# Patient Record
Sex: MALE | Race: WHITE | URBAN - METROPOLITAN AREA
[De-identification: names, ages, dates, MRNs, and addresses within clinical notes are randomized per-mention and may not be internally consistent; named-entity substitution may affect disease eponyms.]

---

## 2018-01-25 ENCOUNTER — TELEPHONE (OUTPATIENT)
Dept: FAMILY MEDICINE | Facility: OTHER | Age: 53
End: 2018-01-25

## 2018-01-25 NOTE — TELEPHONE ENCOUNTER
Influenza-Like Illness ([facility]) Protocol    Lopez D Day      Age: 52 year old     YOB: 1965    Are you currently sick or have you had close contact with someone who is currently sick?   Yes, this patient is currently sick.     Adult Clinical Evaluation    Is this patient experiencing ANY of the following?  Unconsciousness or unresponsiveness No   Difficulty breathing or swallowing No   Blue or dusky lips, skin, or nail beds No   Chest pain No, discomfort from coughing   Severe confusion or delirium No   Seizure activity: ongoing or stopped No   Severe dehydration or signs of shock No   Patient sounds very sick on the phone No     Is this patient experiencing ANY of the following?  Fever > 104 or shaking chills No   Wheezing with minimal response to usual wheezing medications or new wheezing No   Repeated vomiting or diarrhea with signs of dehydration (no urination within last 12 hours) No   Flu-like symptoms that initially improved but returned with fever and a worse cough No   Stiff or painful neck No   Severe headache No     Does the patient have any of the following?  Measured fever > 100 degrees Yes   Chills or feels very warm to the touch Yes   Cough Yes   Sore throat Yes   Muscle/ body aches Yes   Headaches Yes   Fatigue (tiredness) Yes     Nursing Plan      Below are conditions which place adults at increased risk for the more severe complications of influenza.    Does this patient have ANY of the following conditions?  Chronic pulmonary disease such as asthma or COPD No   Heart disease (CHF, CAD, anticoag due to arrhythmia) No   Liver disease (hepatitis, liver failure, cirrhosis) No   Kidney disease (renal failure, insufficiency or dialysis) No   Metabolic disorder (e.g. diabetes) No   Neuromuscular disorder (ANSELMO ALVARADO MD, myasthenia gravis) No   Compromised ability to handle respiratory secretions No   Hematologic disorder (e.g. sickle cell disease) No   HIV / AIDS No   Chemotherapy or radiation  within the last 3 months No   Received an organ or a bone marrow transplant No   Taking Prednisone in excess of 20mg daily No   Is age 65 years or older No   Is pregnant, thinks she may be pregnant or is within two weeks after delivery No   Is a resident of a chronic care facility No   Is patient  or [place] native  No     Patient does not fall into high risk category.  Offered Home Care Education.  If no improvement in 3-5 days, patient should be seen by a provider.    If further questions/concerns or if new symptoms develop, call your PCP or [facility] as soon as possible.      Provided home care instructions    General home care instruction:      Avoid contact with people in your household who are at increased risk for more severe complications of influenza (such as pregnant women or people who have a chronic health condition, for example diabetes, heart disease, asthma, or emphysema).    Stay home from work, school, childcare or other public places until your fever (37.8 degrees Celsius [100 degrees Fahrenheit]) has been gone for at least 24 hours, except to seek medical care. (Fever should be gone without the use of fever-reducing medications.) Use a surgical mask if available, or cover your mouth and nose with a tissue if possible if you need to seek medical care. Contact your work place, school, or  as they may have longer exclusion times.    You may continue to shed virus after your fever is gone. Limit your contact with high-risk individuals for 10 days after your symptoms started and be especially careful to cover your coughs/sneezes and wash your hands.    Cover your cough and wash your hands often, and especially after coughing, sneezing, blowing your nose.    Drink plenty of fluids (such as water, broth, sports drinks, electrolyte beverages for children) to prevent dehydration.    Avoid tobacco and second hand smoke.    Get plenty of rest.    Use over-the-counter pain  relievers as needed per  instructions.    Do not give aspirin (acetylsalicylic acid) or products that contain aspirin (e.g. bismuth subsalicylate - Pepto Bismol) to children or teenagers 18 years or younger.    A small number of people with influenza do not have fever. If you have respiratory symptoms and are at increased risk for complications of influenza, contact your health care provider to discuss these symptoms.    For parents of infants:    If possible, only family members who are not sick should care for infants.    Wash your hands with soap and water, or an alcohol-based hand rub (if your hands are not visibly soiled) before caring for your infant.    Cover your mouth and nose with a tissue when coughing or sneezing, and clean your hands.    Contact a health care provider to discuss your illness within 1-2 days if you are    Pregnant    Immunocompromised    Call 911 if you experience:    Difficulty breathing or shortness of breath    Pain or pressure in the chest    Confusion or less responsive than normal    Seizure activity: ongoing or stopped    Severe dehydration or signs of shock     Blue or dusky lips, skin, or nail beds    If further questions/concerns or if new symptoms develop, call your PCP or [facility] as soon as possible.    When to seek medical attention    Contact your health care provider right away if you experience:    A painful sore throat accompanied by fever persists for more than 48 hours    Ear pain, sinus pain, persistent vomiting and/or diarrhea    Oral temperature greater than 104  Fahrenheit (40  Celsius)    Dehydration (e.g., mouth feeling dry, dizzy when sitting/standing, decreased urine output)    Severe or persistent vomiting; unable to keep fluids down    Improvement in flu-like symptoms (fever and cough or sore throat) but then return of fever and worse cough or sore throat    Not drinking enough fluid    Any other concerns not stated  above      Additional educational resources include:    http://www.[employer].com    http://www.cdc.gov/flu/  Yoly Castro

## 2020-02-28 ENCOUNTER — TRANSCRIBE ORDERS (OUTPATIENT)
Dept: OTHER | Age: 55
End: 2020-02-28

## 2020-02-28 DIAGNOSIS — R51.9 HEADACHE: Primary | ICD-10-CM

## 2020-03-02 ENCOUNTER — PRE VISIT (OUTPATIENT)
Dept: NEUROLOGY | Facility: CLINIC | Age: 55
End: 2020-03-02

## 2020-03-02 ENCOUNTER — DOCUMENTATION ONLY (OUTPATIENT)
Dept: CARE COORDINATION | Facility: CLINIC | Age: 55
End: 2020-03-02

## 2020-03-02 NOTE — TELEPHONE ENCOUNTER
FUTURE VISIT INFORMATION      FUTURE VISIT INFORMATION:    Date: 3/13/2020    Time: 3pm    Location: [facility]  REFERRAL INFORMATION:    Referring provider:      Referring providers clinic:  [facility]     Reason for visit/diagnosis  Headaches     RECORDS REQUESTED FROM:       Clinic name Comments Records Status Imaging Status   [facility]   Scanned to media tab  N/A    [facility] MR Brain 10/24/2018  7/23/2016-ED Visit  Care Everywhere Requested to PACS

## 2020-03-13 ENCOUNTER — OFFICE VISIT (OUTPATIENT)
Dept: NEUROLOGY | Facility: CLINIC | Age: 55
End: 2020-03-13
Payer: COMMERCIAL

## 2020-03-13 VITALS
HEART RATE: 56 BPM | OXYGEN SATURATION: 96 % | SYSTOLIC BLOOD PRESSURE: 155 MMHG | WEIGHT: 184.6 LBS | DIASTOLIC BLOOD PRESSURE: 94 MMHG

## 2020-03-13 DIAGNOSIS — R51.9 CHRONIC DAILY HEADACHE: Primary | ICD-10-CM

## 2020-03-13 RX ORDER — ERENUMAB-AOOE 140 MG/ML
140 INJECTION, SOLUTION SUBCUTANEOUS
COMMUNITY
End: 2020-04-27

## 2020-03-13 RX ORDER — ASPIRIN 81 MG/1
81 TABLET ORAL DAILY
COMMUNITY

## 2020-03-13 RX ORDER — GINSENG 100 MG
1 CAPSULE ORAL DAILY
COMMUNITY

## 2020-03-13 RX ORDER — CANDESARTAN 4 MG/1
TABLET ORAL
Qty: 60 TABLET | Refills: 6 | Status: SHIPPED | OUTPATIENT
Start: 2020-03-13 | End: 2020-04-27

## 2020-03-13 RX ORDER — CEFUROXIME AXETIL 250 MG/1
6 TABLET ORAL 2 TIMES DAILY PRN
COMMUNITY
End: 2020-07-27

## 2020-03-13 RX ORDER — UBROGEPANT 50 MG/1
50 TABLET ORAL
Qty: 12 TABLET | Refills: 3 | Status: SHIPPED | OUTPATIENT
Start: 2020-03-13 | End: 2020-04-07 | Stop reason: DRUGHIGH

## 2020-03-13 SDOH — HEALTH STABILITY: MENTAL HEALTH: HOW MANY STANDARD DRINKS CONTAINING ALCOHOL DO YOU HAVE ON A TYPICAL DAY?: 1 OR 2

## 2020-03-13 SDOH — HEALTH STABILITY: MENTAL HEALTH: HOW OFTEN DO YOU HAVE A DRINK CONTAINING ALCOHOL?: 4 OR MORE TIMES A WEEK

## 2020-03-13 ASSESSMENT — ENCOUNTER SYMPTOMS
ARTHRALGIAS: 1
MUSCLE WEAKNESS: 0
SPEECH CHANGE: 1
CONSTIPATION: 0
MUSCLE CRAMPS: 1
SORE THROAT: 0
NECK MASS: 0
TREMORS: 0
NUMBNESS: 0
DOUBLE VISION: 0
TASTE DISTURBANCE: 0
NAUSEA: 1
BLOATING: 0
LOSS OF CONSCIOUSNESS: 0
DIZZINESS: 0
PARALYSIS: 0
BACK PAIN: 0
EYE REDNESS: 0
STIFFNESS: 0
VOMITING: 0
TINGLING: 0
HOARSE VOICE: 0
SINUS CONGESTION: 0
MEMORY LOSS: 1
JOINT SWELLING: 1
DISTURBANCES IN COORDINATION: 1
WEAKNESS: 0
MYALGIAS: 0
TROUBLE SWALLOWING: 0
SEIZURES: 0
EYE WATERING: 0
ABDOMINAL PAIN: 0
NECK PAIN: 0
SINUS PAIN: 0
EYE PAIN: 1
SMELL DISTURBANCE: 0
HEADACHES: 1
HEARTBURN: 1
EYE IRRITATION: 0

## 2020-03-13 ASSESSMENT — HEADACHE IMPACT TEST (HIT 6)
HOW OFTEN HAVE YOU FELT TOO TIRED TO WORK BECAUSE OF YOUR HEADACHES: VERY OFTEN
HOW OFTEN DO HEADACHES LIMIT YOUR DAILY ACTIVITIES: VERY OFTEN
WHEN YOU HAVE A HEADACHE HOW OFTEN DO YOU WISH YOU COULD LIE DOWN: ALWAYS
HIT6 TOTAL SCORE: 70
HOW OFTEN HAVE YOU FELT FED UP OR IRRITATED BECAUSE OF YOUR HEADACHES: ALWAYS
HOW OFTEN DID HEADACHS LIMIT CONCENTRATION ON WORK OR DAILY ACTIVITY: VERY OFTEN
WHEN YOU HAVE HEADACHES HOW OFTEN IS THE PAIN SEVERE: VERY OFTEN

## 2020-03-13 ASSESSMENT — PAIN SCALES - GENERAL: PAINLEVEL: MODERATE PAIN (4)

## 2020-03-13 NOTE — PATIENT INSTRUCTIONS
Plan:  [facility] [place] [place]   Preventive migraine treatment -a trial of candesartan as instructed. Monitor your blood pressure daily at initiation of therapy and follow up with your PCP for blood pressure management   Acute migraine treatment -a trial of Ubrevly Common side effects : Nausea , Somnolence. Unknown side effects with a long term use.     PMR -trigger points and possible Botox injections or to be seen at our [facility] with unknown date at this time  Follow up in 6-8 weeks or sooner if needed         Patient Education     Candesartan tablets  Brand Name: Atacand  What is this medicine?  CANDESARTAN (solitario eb AR tan) is used to treat high blood pressure in children and adults. This drug is also used to treat adults with heart failure.  How should I use this medicine?  Take this medicine by mouth with a glass of water. Follow the directions on the prescription label. This medicine can be taken with or without food. Take your doses at regular intervals. Do not take your medicine more often than directed.  Talk to your pediatrician regarding the use of this medicine in children. While this drug may be prescribed for children as young as 1 year for selected conditions, precautions do apply. For children who are unable to swallow tablets, the pharmacist can prepare the medicine in an oral suspension.  What side effects may I notice from receiving this medicine?  Side effects that you should report to your doctor or health care professional as soon as possible:    allergic reactions like skin rash, itching or hives, swelling of the face, lips, or tongue    breathing problems    chest pain    decreased amount of urine passed    fast or irregular heart beat    feeling faint or lightheaded, falls    swelling of your hands or feet    Side effects that usually do not require medical attention (report to your doctor or health care professional if they continue or are  bothersome):    change in sex drive or performance    cough    headache    nausea or stomach pain  What may interact with this medicine?    blood pressure medicines    diuretics, especially triamterene, spironolactone, or amiloride    lithium    NSAIDs, medicines for pain and inflammation, like ibuprofen or naproxen    potassium salts or potassium supplements    What if I miss a dose?  If you miss a dose, take it as soon as you can. If it is almost time for your next dose, take only that dose. Do not take double or extra doses.  Where should I keep my medicine?  Keep out of the reach of children.  Store at room temperature between 15 and 30 degrees C (59 and 86 degrees F). Protect from light. Keep container tightly closed. Throw away any unused medicine after the expiration date.  What should I tell my health care provider before I take this medicine?  They need to know if you have any of these conditions:    heart failure    kidney or liver disease    if you are on a special diet, such as a low salt diet    an unusual or allergic reaction to candesartan, other medicines, foods, dyes, or preservatives    pregnant or trying to get pregnant    breast-feeding  What should I watch for while using this medicine?  Visit your doctor or health care professional for regular checks on your progress. Check your blood pressure as directed. Ask your doctor or health care professional what your blood pressure should be and when you should contact him or her. Call your doctor or health care professional if you notice an irregular or fast heart beat.  Women should inform their doctor if they wish to become pregnant or think they might be pregnant. There is a potential for serious side effects to an unborn child, particularly in the second or third trimester. Talk to your health care professional or pharmacist for more information.  You may get drowsy or dizzy. Do not drive, use machinery, or do anything that needs mental alertness  until you know how this drug affects you. Do not stand or sit up quickly, especially if you are an older patient. This reduces the risk of dizzy or fainting spells.  Avoid salt substitutes unless you are told otherwise by your doctor or health care professional.  Do not treat yourself for coughs, colds, or pain while you are taking this medicine without asking your doctor or health care professional for advice. Some ingredients may increase your blood pressure.  NOTE:This sheet is a summary. It may not cover all possible information. If you have questions about this medicine, talk to your doctor, pharmacist, or health care provider. Copyright  2019 [employer]

## 2020-03-13 NOTE — PROGRESS NOTES
"Re: Lopez Cancino      MRN# [id number]  YOB: 1965  Date of Visit:3/13/2020    OUTPATIENT NEUROLOGY VISIT NOTE    Chief Complaint:  Headache evaluation    History of Present Illness  Lopez Cancino is a 54-year-old male presents to the clinic today for headache evaluation.  Accompanied to today's appointment by his wife.  Headache History:    Onset History:  For 14 years ago and seen 7 neurologists for headaches in [place] and [facility] in [place], [place] and [place]. Unfortunately no outside records available  Wife and patient think that headaches are related to \"exposure in the \"     Current Headache Pattern:      Frequency (How many headache days per month?): 24/7 and varies in the intensity and wakes up with headaches      Duration of Headache: severe headache longest 2 days      Aura: none      Associated Symptoms:  nausea, light sensitivity, sound sensitivity and hard to think, focus or articulate, \"spotty vision\"        Description of Headache Pain & Location: bitemporal across his forehead and top of the head and \"top behind\" and feels like a sharp pain and once it gets more intense it \"grows sharper\" and feels like an \"ice picks\" and sometimes left >right \"ice pick thru\" and       Level of Pain during usual headache:  4/10    Level of Pain during the worst headache:  Up to 9/10 rarely and 8/10 at least couple times per week       Treatments Tried:    Started Aimovig 2 years ago and \"levels\" his headache up and satisfied about 25%   Tylenol extra strength for a few month  [facility]ac [facility]  Sumatriptan injectable did not work  Oxycodone -in the past and \"hate to take it\"  Tizanidine -did not help  miloxicam -did not help  Rizatriptan did not help  Botox -had only once   Ambien -did not help  Amitriptyline -caused \"hearing voices\"  depakote -did not do anything  Dilantin -did not help  topiramate -history of renal stones  Verapamil " "-it did not do anything  Propranolol did not touch it  [facility] -\"[facility] course [facility]\" did not help  3 sessions of acupuncture did not help  Cefaly -did not help  DHE infusion   Demerol did not help  Uses CPAP for 14 years   Stopped ibuprofen about 2 months ago and no difference  BBB-4-5 years ago      Have you needed to utilize the Emergency Room to treat your headache symptoms?  No    What makes your headaches better?  Sometimes sleeping but \"not neccessary\"     What makes your headaches worse or triggers your headaches? All random      No head injuries   Was in the  for 21 years -[employer] - and  in the [employer] and retired in 2008. Currently works as a laser service engeneer  Sleeping -toss and turn and does not take anything just a Tylenol PM  Caffeine -1-2 cups per day =64oz per day +cutting back on soda and may be 4 cans per week   Quit smoking 5-6 years ago   Alcohol -1-2 glasses per day -burgundy 1-2 glasses or beer 16 oz daily   Hydration-about 3-6 bottles per day   Exercise-bike and weight +job        Neurodiagnostic Testing not available but presumed normal  [facility] MRIs, MRAs, [facility]  [facility]   Past Medical History reviewed and verified with the patient  Principal Problem:  Renal colic  Active Problems:  Uric acid nephrolithiasis  Sleep apnea and uses CPAP  Nicotine addiction quit 5-6 years   Lactic acidosis/sepsis in 2018  ARF (acute renal failure) recovered  Gastroesophageal reflux disease without esophagitis  HTN (hypertension) and not on medications   history of fever and a blood culture positive for Staphylococcus, coagulase negative (in 1 out of 2 bottles from 06/09/2018).     Past Surgical History reviewed and verified with the patient  Right shoulder/clavicle surgery -  Hernia surgery   Vasectomy  Knee surgery   Family History reviewed and verified with the patient  No headaches   Social History:  Wife of 27 years and has 4 kids-3 " daughters and son, and has 6 grandkids  Social History     Tobacco Use     Smoking status: Former Smoker     Smokeless tobacco: Former User   Substance Use Topics     Alcohol use: Yes     Frequency: 4 or more times a week     Drinks per session: 1 or 2    reviewed and verified with the patient     Allergies   Allergen Reactions     Nuts Anaphylaxis     Can't have anything that's been in contact with nuts.     Amitriptyline      Other reaction(s): Confusion     Hmg-Coa-R Inhibitors Other (See Comments)     Cramping     Penicillins      Other reaction(s): Fever       Current Outpatient Medications   Medication Sig Dispense Refill     aspirin 81 MG EC tablet Take 81 mg by mouth daily       Cholecalciferol (VITAMIN D3) 50 MCG (2000 UT) CHEW Take 4,000 Units by mouth daily       erenumab-aooe (AIMOVIG) 140 MG/ML injection Inject 140 mg Subcutaneous every 28 days       magnesium 500 MG TABS Take 1 tablet by mouth daily       ranitidine (ZANTAC) 150 MG tablet Take 150 mg by mouth 2 times daily       SUMAtriptan (IMITREX STATDOSE) 6 MG/0.5ML pen injector kit Inject 6 mg Subcutaneous 2 times daily as needed       zinc 50 MG TABS Take 1 tablet by mouth daily     reviewed and verified with the patient    Review of Systems:   A 12-point ROS including constitutional, eyes, ENT, respiratory, cardiovascular, gastroenterology, genitourinary, integumentary, musculoskeletal, neurology, hematology and psychiatric were all reviewed with the patient and completed at the [facility] Question coy and as mentioned in the HPI.     General Exam:   BP (!) 155/94 (BP Location: Left arm, Patient Position: Sitting, Cuff Size: Adult Large)   Pulse 56   Wt 83.7 kg (184 lb 9.6 oz)   SpO2 96%   GEN: Awake, NAD; good eye contact, responses appropriately, headache today 4-5/10 on the numeric pain scale   HEENT: Head atraumatic/Normocephalic. Scalp normal. Temples -trigger points.  Pupils equally round, 4 mm, reactive to light and  accommodation, sclera and conjunctiva normal. Fundoscopic examination reveals normal vessels no papilledema.   Neck: Easily moveable without resistance  Heart: S1/S2 appreciated, RRR, no m/r/g, no carotid bruits  Lungs:Lungs are clear to auscultation bilaterally, no wheezes or crackles.   Neurological Examination:  The patient is alert and oriented times four. Has good attention and concentration.  Speech is fluent without dysarthria.   Cranial nerves:  CN I deferred.   CN II: Intact and full visual fields to confrontation bilaterally.   CN III, IV, VI: EOM intact. There is no nystagmus. Has conjugated gaze. Intact direct and consensual pupillary light reflexes.   CN V: Intact and symmetrical to facial sensation in the V1 through V3 bilaterally.   CN VII: Intact and symmetrical eyebrow and lid raise and eyelid closure, smiles and frown.   CN VIII: Intact to finger rub bilaterally.   CN IX and X: The palates elevates symmetrical. The uvula is midline.   CN XII: The tongue protrudes midline with no atrophy or fasciculations.   Motor exam: The patient has a normal bulk and tone throughout. There is no atrophy, fasciculations, clonus, or abnormal movements appreciated.   Strength Exam:  5/5 strength at shoulder abduction, elbow flexion or extension, wrist flexion or extension, finger abduction, , hip flexion and extension, knee flexion and extension, and dorsiflexion and plantarflexion bilaterally.   Sensation is intact to light touch throughout.  Reflexes are 2 and symmetrical at biceps, triceps, brachioradialis, patellar, and Achilles.   Negative Babinski with downgoing toes bilaterally.   Coordination reveals finger-nose-finger with normal speed and accuracy.   Station and gait is normal. There is no ataxia. Can walk on the toes, heels, and tandem walk without difficulty. Has no drift and a negative Romberg. Abimael's negative          Assessment and Plan:  Chronic daily headache possible mixed etiology-chronic  daily headaches and migraine chronic   Lowered headache improvement expectations to at least 10-20% vs 60-70% suggested by patient's wife  Patient and wife do not remember all of then medications and no outside records brought or sent to us.   Plan:  [facility] [place] [place]   Preventive migraine treatment -a trial of candesartan as instructed. Monitor your blood pressure daily at initiation of therapy and follow up with your PCP for blood pressure management   Acute migraine treatment -a trial of Ubrevly Common side effects : Nausea , Somnolence. Unknown side effects with a long term use.     PMR -trigger points and possible Botox injections or to be seen at our [facility] with unknown date at this time  Follow up in 6-8 weeks or sooner if needed     I discussed all my recommendation with Lopez Cancino. The patient verbalizes understanding and comfortable with the plan. The patient has our clinic phone number to call with any questions or concerns. All of the patient's questions were answered from the best of my current knowledge.     Thank you for letting me be a part of the treatment team for Lopez Cancino      Time spent with pt answering questions, discussing findings, counseling and coordinating care was more than 50% the appointment time, 56  minutes.         LUIS CARLOS Pritchett, CNP  [facility]

## 2020-03-13 NOTE — NURSING NOTE
Chief Complaint   Patient presents with     Headache     [facility] NEW - HEADACHES       Alvarado Dean, EMT

## 2020-03-26 ENCOUNTER — VIRTUAL VISIT (OUTPATIENT)
Dept: NEUROLOGY | Facility: CLINIC | Age: 55
End: 2020-03-26
Payer: COMMERCIAL

## 2020-03-26 DIAGNOSIS — R51.9 CHRONIC DAILY HEADACHE: Primary | ICD-10-CM

## 2020-03-26 NOTE — PROGRESS NOTES
"Lopez Cancino is a 54 year old male who is being evaluated via a billable telephone visit. This visit was conducted via telephone due to the current COVID-19 crisis to reduce patient risk.        The patient has been notified of following:     \"This telephone visit will be conducted via a call between you and your physician/provider. We have found that certain health care needs can be provided without the need for a physical exam.  This service lets us provide the care you need with a short phone conversation.  If a prescription is necessary we can send it directly to your pharmacy.  If lab work is needed we can place an order for that and you can then stop by our lab to have the test done at a later time.    If during the course of the call the physician/provider feels a telephone visit is not appropriate, you will not be charged for this service.\"     Lopez Cancino complains of    Chief Complaint   Patient presents with     Telephone     [facility] TELEPHONE VISIT - HEADACHE 6 WK F/U        I have reviewed and updated the patient's Allergies and Medication List.    ALLERGIES  Nuts; Amitriptyline; Hmg-coa-r inhibitors; and Penicillins    Yudy Duarte, EMT    Provider Notes: This is a 54-year-old male who presents for a telephone visit for headache follow-up.  Initial headache clinic visit 3/13/2020, see note for details.  Patient was started on candesartan and Ubrevly.   Headache history  for 14 years ago and seen 7 neurologists for headaches in [place] and [facility] in [place].   Headache treatment tried:  Treatments Tried:    Started Aimovig 2 years ago and \"levels\" his headache up and satisfied about 25%   Tylenol extra strength for a few month  [facility]  Sumatriptan injectable did not work  Oxycodone -in the past and \"hate to take it\"  Tizanidine -did not help  miloxicam -did not help  Rizatriptan did not help  Botox -had only once " "  Ambien -did not help  Amitriptyline -caused \"hearing voices\"  depakote -did not do anything  Dilantin -did not help  topiramate -history of renal stones  Verapamil -it did not do anything  Propranolol did not touch it  [facility]  [facility] -\"[facility]\" did not help  3 sessions of acupuncture did not help  Cefaly -did not help  DHE infusion   Demerol did not help  Uses CPAP for 14 years   Stopped ibuprofen about 2 months ago and no difference  BBB-4-5 years ago     Today patient reports that \"spikes\" of his headaches in the past 2 weeks -7 \"spikes\"  with averaging pain level 5-8 out of 10 on the numeric pain scale.  Patient reports daily headache mild and baseline 3 out of 10 on the pain scale.   Patient reports that he did not start candesartan yet.  Patient reports that he just fill Ubrevly and was able to try it once with a positive effect-reports that his headache has decreased in severity and he felt that Ubrevly was effective.  Discussed acute and preventive headache treatment plan.  Patient has been taking Aimovig 140 mg monthly which also helped him to reduce the headache frequency.     Assessment/Plan:  Chronic daily headaches chronic migraine  Plan discussed with the patient-a trial of candesartan for migraine prevention as discussed at his initial visit-we will start with a small dose and monitor for intolerance or side effects and response to treatment..  Continue Aimovig 140 mg monthly   Acute migraine treatment- Ubrevly  Follow-up in 4 weeks via phone    Phone call duration: 15 minutes    LUIS CARLOS Reyes CNP    "

## 2020-04-27 ENCOUNTER — TELEPHONE (OUTPATIENT)
Dept: NEUROLOGY | Facility: CLINIC | Age: 55
End: 2020-04-27

## 2020-04-27 ENCOUNTER — VIRTUAL VISIT (OUTPATIENT)
Dept: NEUROLOGY | Facility: CLINIC | Age: 55
End: 2020-04-27
Payer: COMMERCIAL

## 2020-04-27 DIAGNOSIS — R51.9 CHRONIC DAILY HEADACHE: ICD-10-CM

## 2020-04-27 RX ORDER — CANDESARTAN 4 MG/1
TABLET ORAL
Qty: 60 TABLET | Refills: 6 | Status: SHIPPED | OUTPATIENT
Start: 2020-04-27 | End: 2020-05-27

## 2020-04-27 NOTE — PATIENT INSTRUCTIONS
Plan:  Stop Ubrelvy because we are starting Emgality trial.  There is little data to support this, but there are cases (I read a study of 2 patients) who benefit from CGRP inhibitors both for prevention and acute treatment   Acute migraine headache treatment -sumatriptan inj as needed but limit use to no more than 8 days per month  Migraine headache prevention-a trial of Emgality as instructed. Side effects -pain, swelling or redness in the injection side or allergic reaction and unknown side effects with a long term use.   Continue Candesartan 4 mg twice daily  Follow up 3 months or sooner if needed

## 2020-04-27 NOTE — PROGRESS NOTES
"Lopez Cancino is a 54 year old male who is being evaluated via a billable telephone visit.  This visit was conducted via telephone due to the current COVID-19 crisis to reduce patient risk.       The patient has been notified of following:     \"This telephone visit will be conducted via a call between you and your physician/provider. We have found that certain health care needs can be provided without the need for a physical exam.  This service lets us provide the care you need with a short phone conversation.  If a prescription is necessary we can send it directly to your pharmacy.  If lab work is needed we can place an order for that and you can then stop by our lab to have the test done at a later time.    Telephone visits are billed at different rates depending on your insurance coverage. During this emergency period, for some insurers they may be billed the same as an in-person visit.  Please reach out to your insurance provider with any questions.    If during the course of the call the physician/provider feels a telephone visit is not appropriate, you will not be charged for this service.\"    Patient has given verbal consent for Telephone visit? YES    How would you like to obtain your AVS? Venkat Duarte, EMT    Subjective     Lopez Cancino is a 54 year old male who presents to clinic today for the following health issues: headache follow up.     HPI  This is a 54-year-old male who presents for a telephone visit for headache follow-up.  Initial headache clinic visit 3/13/2020, see note for details.  Patient was started on candesartan for migraine headache prevention and Ubrevly for acute migraine treatment.   Headache history  for 14 years ago and seen 7 neurologists for headaches in [place] and [facility] in [place], [place].   Headache treatment tried:  Treatments Tried:    Started Aimovig 2 years ago and \"levels\" his headache up and satisfied about 25% and " "stopped taking   Tylenol extra strength for a few month  [facility]  Sumatriptan injectable did not work  Oxycodone -in the past and \"hate to take it\"  Tizanidine -did not help  miloxicam -did not help  Rizatriptan did not help  Botox -had only once but was painful  Ambien -did not help  Amitriptyline -caused \"hearing voices\"  depakote -did not do anything  Dilantin -did not help  topiramate -history of renal stones  Verapamil -it did not do anything  Propranolol did not touch it  Physical [facility]  [facility] -\"[facility]\" did not help  3 sessions of acupuncture did not help  Cefaly -did not help  DHE infusion   Demerol did not help  Uses CPAP for 14 years   Stopped ibuprofen about 2 months ago and no difference  BBB-4-5 years ago   Per MyCHART Candesartan 4mg twice a day-not effective but BP is great but not somewhat tendency to lower BP to increase dose  blood pressure history:  04-=127/86  04-=122/80  04-=123/79  04-=112/73  04-=117/74  04-=407/76  04-=113/78  04-=109/70  04-=104/77    Patient reports that his headache frequency daily and 30 days out of 30 days per month with over 8 migraine headache days per month lasting a day but varies in severity though the day and localized to the temples and an top of his head. Patient reports that Ubrelvy helps and drops the headache down to 4-5/10 from 6-8/10 on the numeric pain scale.   Patient denies any OTC analgesics use.   Discussed a trial Emgality for migraine prevention. Discussed Emgality side effects and unknown side effects with a long term use.   Discussed that There is little data to support this, but there are cases (I read a study of 2 patients) who benefit from CGRP inhibitors both for prevention and acute treatment. In theory, patients who respond well to one may respond well to the other. However, there are unknowns with CGRP inhibitors, like any " newer medication, and multiple CGRP blocking medications may produce compounding adverse events, which may not yet have been reported.     PMH: reviewed  Renal colic  Active Problems:  Uric acid nephrolithiasis  Sleep apnea and uses CPAP  Nicotine addiction quit 5-6 years   Lactic acidosis/sepsis in 2018  ARF (acute renal failure) recovered  Gastroesophageal reflux disease without esophagitis  HTN (hypertension) and not on medications   history of fever and a blood culture positive for Staphylococcus, coagulase negative (in 1 out of 2 bottles from 06/09/2018).   Review of Systems          Objective   Reported vitals:  There were no vitals taken for this visit.   alert, no distress and cooperative  PSYCH: Alert and oriented times 3; coherent speech, normal   rate and volume, able to articulate logical thoughts, able   to abstract reason, no tangential thoughts, no hallucinations   or delusions      Allergies reviewed  Current prescription medications reviewed  Current Outpatient Medications   Medication     aspirin 81 MG EC tablet     candesartan (ATACAND) 4 MG tablet     Cholecalciferol (VITAMIN D3) 50 MCG (2000 UT) CHEW     galcanezumab-gnlm (EMGALITY) 120 MG/ML injection     magnesium 500 MG TABS     ranitidine (ZANTAC) 150 MG tablet     SUMAtriptan (IMITREX STATDOSE) 6 MG/0.5ML pen injector kit     zinc 50 MG TABS     No current facility-administered medications for this visit.         Allergies   Allergen Reactions     Nuts Anaphylaxis     Can't have anything that's been in contact with nuts.     Amitriptyline      Other reaction(s): Confusion     Hmg-Coa-R Inhibitors Other (See Comments)     Cramping     Penicillins      Other reaction(s): Fever     Assessment/Plan:  Chronic daily headaches and chronic migraine   Plan discussed with the patient who is in agreement with the plan :  Stop Ubrelvy because we are starting Emgality trial.  There is little data to support this, but there are cases (I read a study of 2  patients) who benefit from CGRP inhibitors both for prevention and acute treatment   Acute migraine headache treatment -sumatriptan inj as needed but limit use to no more than 8 days per month  Migraine headache prevention-a trial of Emgality as instructed. Side effects -pain, swelling or redness in the injection side or allergic reaction and unknown side effects with a long term use.   Continue Candesartan 4 mg twice daily  Follow up 3 months or sooner if needed    Refill of candesartan and new prescription for Emgality sent to the pharmacy.     Phone call duration:  28 minutes    LUIS CARLOS Reyes CNP  Neurology/Headache Clinic

## 2020-04-27 NOTE — TELEPHONE ENCOUNTER
"Prior Authorization Retail Medication Request    Medication/Dose: galcanezumab-gnlm (EMGALITY) 120 MG/ML injection; Inject 240 mg subcutaneous first month and then inject 120 mg subcutaneous every 28 days   ICD code (if different than what is on RX):  G43.709  Previously Tried and Failed:  Started Aimovig 2 years ago and \"levels\" his headache up and satisfied about 25% and stopped taking   Tylenol extra strength for a few month  [facility]  Sumatriptan injectable did not work  Oxycodone -in the past and \"hate to take it\"  Tizanidine -did not help  miloxicam -did not help  Rizatriptan did not help  Botox -had only once but was painful  Ambien -did not help  Amitriptyline -caused \"hearing voices\"  depakote -did not do anything  Dilantin -did not help  topiramate -history of renal stones  Verapamil -it did not do anything  Propranolol did not touch it  Physical therapy  [facility] -\"[facility]\" did not help  3 sessions of acupuncture did not help  Cefaly -did not help  DHE infusion   Demerol did not help  Uses CPAP for 14 years   Stopped ibuprofen about 2 months ago and no difference  BBB-4-5 years ago   Per MyCHART Candesartan 4mg twice a day-not effective but BP is great but not somewhat tendency to lower BP to increase dose  blood pressure history:  04-=127/86  04-=122/80  04-=123/79  04-=112/73  04-=117/74  04-=800/76  04-=113/78  04-=109/70  04-=104/77     Patient reports that one headache frequency daily and  lasting a day but varies in severity though the day and localized to the temples and an top of his head. Patient reports that Ubrelvy helps and drops the headache down to 4-5/10 from 6-8/10 on the numeric pain scale.   Patient denies any OTC use.   Discussed a trial Emgality for migraine prevention.   There is little data to support this, but there are cases (I read a study of 2 patients) who benefit " from CGRP inhibitors both for prevention and acute treatment. In theory, patients who respond well to one may respond well to the other. However, there are unknowns with CGRP inhibitors, like any newer medication, and multiple CGRP blocking medications may produce compounding adverse events, which may not yet have been reported.      Rationale:  Chronic migraine    Insurance Name: [employer]  Insurance ID:  [id number]       Pharmacy Information (if different than what is on RX)  Name:    Phone:

## 2020-04-28 NOTE — TELEPHONE ENCOUNTER
Prior Authorization Approval    Authorization Effective Date: 4/28/2020  Authorization Expiration Date: 4/30/2020  Medication: galcanezumab-gnlm (EMGALITY) 120 MG/ML injection- APPROVED  Approved Dose/Quantity:   Reference #:     Insurance Company: [employer] - Phone [phone] Fax [phone]  Expected CoPay:       CoPay Card Available:      Foundation Assistance Needed:    Which Pharmacy is filling the prescription (Not needed for infusion/clinic administered): [facility] - [address]  Pharmacy Notified: Yes  Patient Notified: No    Called the [facility] pharmacy to notify of approval but the answering rep stated they are not sure who to direct me to notify of this. She put me on hold then came back and requested to have the approval letter faxed to [phone] and it will be sorted to the correct dept. Letter was faxed.

## 2020-04-28 NOTE — TELEPHONE ENCOUNTER
Central Prior Authorization Team   Phone: [phone]      PA Initiation    Medication: galcanezumab-gnlm (EMGALITY) 120 MG/ML injection  Insurance Company: [employer] - Phone [phone] Fax [phone]  Pharmacy Filling the Rx: [facility] - [place], [place] - [address] [place] [address]  Filling Pharmacy Phone: [phone]  Filling Pharmacy Fax:    Start Date: 4/28/2020

## 2020-04-29 ENCOUNTER — CARE COORDINATION (OUTPATIENT)
Dept: NEUROLOGY | Facility: CLINIC | Age: 55
End: 2020-04-29

## 2020-04-29 NOTE — PROGRESS NOTES
Clinic received fax from pharmacy regarding alternative medications for pt Emgality. Per PA team Emgality was approved by insurance and pharmacy notified last evening via phone and fax.     Will disregard PA forms from pharmacy at this time due to PA being approved.     Isabella DAMON

## 2020-05-05 ENCOUNTER — TELEPHONE (OUTPATIENT)
Dept: NEUROLOGY | Facility: CLINIC | Age: 55
End: 2020-05-05

## 2020-05-05 NOTE — TELEPHONE ENCOUNTER
[facility] Call Center    Phone Message    May a detailed message be left on voicemail: yes     Reason for Call: Medication Question or concern regarding medication   Prescription Clarification  Name of Medication: 1) candesartan (ATACAND) 4 MG tablet   And 2) galcanezumab-gnlm (EMGALITY) 120 MG/ML injection  Prescribing Provider: Kavita Rosenthal   Pharmacy: [facility] - [place] [address]    What on the order needs clarification? Pharmacy tech has questions. There have been faxes going back and forth this past week. Pharmacy Berna Dhillon, is asking care team for Kavita Rosenthal to call her please. Thank you.          Action Taken: Message routed to:  [facility]  Neurology    Travel Screening: Not Applicable

## 2020-05-05 NOTE — TELEPHONE ENCOUNTER
----- Message from Veniec Noble RN sent at 5/5/2020  2:10 PM CDT -----  Hello,    Could you please fax clinic notes from Kavita dated 3/26 and 3/13 [facility].  [phone].    Thank you!  Venice

## 2020-05-05 NOTE — TELEPHONE ENCOUNTER
I returned a call to [facility].  She is requesting clinic notes for their review.  Even though Emgality has been approved it has to be approved through [facility].  They will review the clinic notes and determine if they will cover the cost for the patient.  She is also requesting that the pt's Candesartan be changed to losartan.  They do not have candesartan on their formulary.   I told her that we would fax the clinic notes to her and I would ask the provider for a medication change.  She appreciated the call back.    Message sent to our navigator team asking them to fax the clinic notes to [phone] attention Berna    Message sent to Kavita asking for candesartan be changed to losartan.

## 2020-05-06 ENCOUNTER — DOCUMENTATION ONLY (OUTPATIENT)
Dept: NEUROLOGY | Facility: CLINIC | Age: 55
End: 2020-05-06

## 2020-05-06 NOTE — PROGRESS NOTES
[facility] form has been faxed to the [facility] at [phone]      [facility] form received and given to Kavita

## 2020-05-21 NOTE — TELEPHONE ENCOUNTER
[facility] Call Center    Phone Message    May a detailed message be left on voicemail: yes     Reason for Call: Other: Sherrie calling to state they have not heard back in regards to pts medication. She would like to know the status of Rx. Please call her back as soon as possible to discuss.      Action Taken: Message routed to:  [facility]     Travel Screening: Not Applicable

## 2020-05-22 ENCOUNTER — TELEPHONE (OUTPATIENT)
Dept: NEUROLOGY | Facility: CLINIC | Age: 55
End: 2020-05-22

## 2020-05-22 ENCOUNTER — MYC MEDICAL ADVICE (OUTPATIENT)
Dept: NEUROLOGY | Facility: CLINIC | Age: 55
End: 2020-05-22

## 2020-05-22 NOTE — TELEPHONE ENCOUNTER
I called pt pharmacy back regarding his candesartan. I spoke with Ida and let her know we are waiting to hear from patient regarding if he is okay with switching to losartan which is on formulary. When we hear back with okay we will fax new script. She requests script be faxed directly to pharmacy at fax # [phone].     Isabella DAMON

## 2020-05-27 ENCOUNTER — TELEPHONE (OUTPATIENT)
Dept: NEUROLOGY | Facility: CLINIC | Age: 55
End: 2020-05-27

## 2020-05-27 RX ORDER — LOSARTAN POTASSIUM 25 MG/1
25 TABLET ORAL DAILY
Qty: 30 TABLET | Refills: 3 | Status: SHIPPED | OUTPATIENT
Start: 2020-05-27 | End: 2020-07-27 | Stop reason: SINTOL

## 2020-05-27 NOTE — TELEPHONE ENCOUNTER
----- Message from Isabella Reyes RN sent at 5/27/2020 11:21 AM CDT -----  Regarding: fax Rx  Hello,     Please fax pt [facility]artan [facility] [phone].     Thanks    Isabella DAMON

## 2020-05-27 NOTE — TELEPHONE ENCOUNTER
[facility] Call Center    Phone Message    May a detailed message be left on voicemail: no     Reason for Call: Medication Question or concern regarding medication   Prescription Clarification  Name of Medication: Losartan  Prescribing Provider: Kavita Rosenthal NP   Pharmacy: [facility]   What on the order needs clarification? Pharmacy needs script, Pt confirmed losartan is fine instead of candesartan          Action Taken: Message routed to:  [facility]    Travel Screening: Not Applicable

## 2020-07-27 ENCOUNTER — TELEPHONE (OUTPATIENT)
Dept: NEUROLOGY | Facility: CLINIC | Age: 55
End: 2020-07-27

## 2020-07-27 ENCOUNTER — VIRTUAL VISIT (OUTPATIENT)
Dept: NEUROLOGY | Facility: CLINIC | Age: 55
End: 2020-07-27
Payer: COMMERCIAL

## 2020-07-27 DIAGNOSIS — R51.9 CHRONIC DAILY HEADACHE: ICD-10-CM

## 2020-07-27 RX ORDER — ONABOTULINUMTOXINA 200 [USP'U]/1
155 INJECTION, POWDER, LYOPHILIZED, FOR SOLUTION INTRADERMAL; INTRAMUSCULAR
Qty: 155 UNITS | Refills: 4 | Status: SHIPPED | OUTPATIENT
Start: 2020-07-27

## 2020-07-27 RX ORDER — FAMOTIDINE 20 MG/1
TABLET, FILM COATED ORAL
COMMUNITY
Start: 2020-06-10

## 2020-07-27 RX ORDER — CANDESARTAN 4 MG/1
TABLET ORAL
Qty: 60 TABLET | Refills: 6 | Status: SHIPPED | OUTPATIENT
Start: 2020-07-27

## 2020-07-27 NOTE — LETTER
7/27/2020       RE: Lopez Cancino  [address]  [place] [address]     Dear Colleague,    Thank you for referring your patient, Lopez Cancino, to the [facility] NEUROLOGY at [facility]. Please see a copy of my visit note below.    Lopez Cancino is a 55 year old male who is being evaluated via a billable telephone visit.      YAIR Vaughn      Phone call duration: 28 minutes  Start 12:53  End 13:21    Reason for visit:  Headache follow-up. This visit was conducted via telephone visit due to the current COVID-19 crisis to reduce patient risk.  Verbal consent was obtained.     Interval history:  This is a 55-year old  who presents to [facility] headache clinic for headache follow-up.  Established patient since 3/2/ 2020, see initial visit note for details.  Headache History:    Onset History:  For 14 years ago and seen 7 neurologists for headaches in [place] and [facility] in [place]. Unfortunately no outside records available,  Patient was started on candesartan for migraine headache but unfortunately after a short trial patient's pharmacy requested a change from candesartan to losartan.  Patient reports that headaches more frequent a week since changing from candesartan to losartan. Patient reports that candesartan was more effective with controlling headaches.   Patient reports that he has been doing Emgality since 5/12/2020 and no side effects reported so far.   Patient reports that he had Botox once by one of [facility] doctors in 2007 and he is opened to get Botox injections.   Headache is predominately -temples, forehead, top of the head. Pain feels like sharp/dull and a solid pain/pressure feeling.   Headache frequency daily and baseline headache 6/10 (baseline) and can go up to 8/10 (lately after changes from candesartan to losartan).   No problems with TMJ and denies grinding teeth when sleeps.   Reports that blood  "pressure very good. Reports BP ranges 105-120 for systolic BP.   Treatments Tried:    Started Aimovig 2 years ago and \"levels\" his headache up and satisfied about 25%   Tylenol extra strength for a few month  [facility]  Sumatriptan injectable did not work  Oxycodone -in the past and \"hate to take it\"  Tizanidine -did not help  miloxicam -did not help  Rizatriptan did not help  Botox -had only once   Ambien -did not help  Amitriptyline -caused \"hearing voices\"  depakote -did not do anything  Dilantin -did not help  topiramate -history of renal stones  Verapamil -it did not do anything  Propranolol did not touch it  Physical [facility]  [facility] -\"[facility]\" did not help  3 sessions of acupuncture did not help  Cefaly -did not help  DHE infusion   Demerol did not help  Uses CPAP for 14 years   Stopped ibuprofen about 2 months ago and no difference  BBB-4-5 years ago   ubrelvy helped but was taking it every day       Headache treatment plan:  To reinstate candesartan and stop  Losartan due to headaches got worse when candesartan stopped and started losartan  Adding Botox injections  Ubrelvy 100 mg at severe migraine headache onset and may repeat it in 2 hours as needed. Max 200 mg in 24 hours. Limit use to no more than 8 days per month. (MAX, 200 mg/24 hrs; safety of treating more than 8 migraines in a 30-day period has not been established)  Ketorolac as needed.   Follow up in 2-3 months or sooner if needed-video appointment.        Prescriptions for Botox, Ubrelvy and candesartan with given to the patient and side effects reviewed patient is in agreement with side effects and agreed to try the recommended treatment.    PMH, allergies and current prescription medications reviewed    10 point ROS of systems including Constitutional, Eyes, Respiratory, Cardiovascular, Gastroenterology, Genitourinary, Integumentary, Muscularskeletal, Psychiatric were reviewed and no concerns " reported today unless as mentioned in the interval history    Patient sounds alert and no in apparent acute distress,  mentation appears normal, judgement and insight intact, normal speech.      I spent a total of 28 minutes for telemedicine consult with Lopez Cancino during today s video meeting. Over 50% of this time was spent counseling the patient and/or coordinating care    LUIS CARLOS Lua [facility]

## 2020-07-27 NOTE — PATIENT INSTRUCTIONS
Plan:  To reinstate candesartan and stop  Losartan due to headaches got worse when candesartan stopped and started losartan  Adding Botox injections  Ubrevly 100 mg at severe migraine headache onset and may repeat it in 2 hours as needed. Max 200 mg in 24 hours. Limit use to no more than 8 days per month. (MAX, 200 mg/24 hrs; safety of treating more than 8 migraines in a 30-day period has not been established)  Ketorolac as needed.   Follow up in 2-3 months or sooner if needed-video appointment.

## 2020-07-27 NOTE — TELEPHONE ENCOUNTER
"Prior Authorization Retail Medication Request    Medication/Dose: candesartan (ATACAND) 4 MG tablet; Take one tab daily for one week, then one tab twice daily  ICD code (if different than what is on RX):  R51, G43.709  Previously Tried and Failed:Treatments Tried:    Started Aimovig 2 years ago and \"levels\" his headache up and satisfied about 25%   Tylenol extra strength for a few month  [facility]  Sumatriptan injectable did not work  Oxycodone -in the past and \"hate to take it\"  Tizanidine -did not help  miloxicam -did not help  Rizatriptan did not help  Botox -had only once   Ambien -did not help  Amitriptyline -caused \"hearing voices\"  depakote -did not do anything  Dilantin -did not help  topiramate -history of renal stones  Verapamil -it did not do anything  Propranolol did not touch it  [facility]  [facility] -\"[facility]\" did not help  3 sessions of acupuncture did not help  Cefaly -did not help  DHE infusion   Demerol did not help  Uses CPAP for 14 years   Stopped ibuprofen about 2 months ago and no difference  BBB-4-5 years ago   ubrelvy helped but was taking it every day           Rationale:  Chronic migraine    Insurance Name:  [employer]  Insurance ID:  [id number]       Pharmacy Information (if different than what is on RX)  Name:    Phone:      "

## 2020-07-27 NOTE — PROGRESS NOTES
"Lopez Cancino is a 55 year old male who is being evaluated via a billable telephone visit.      The patient has been notified of following:     \"This telephone visit will be conducted via a call between you and your physician/provider. We have found that certain health care needs can be provided without the need for a physical exam.  This service lets us provide the care you need with a short phone conversation.  If a prescription is necessary we can send it directly to your pharmacy.  If lab work is needed we can place an order for that and you can then stop by our lab to have the test done at a later time.    Telephone visits are billed at different rates depending on your insurance coverage. During this emergency period, for some insurers they may be billed the same as an in-person visit.  Please reach out to your insurance provider with any questions.    If during the course of the call the physician/provider feels a telephone visit is not appropriate, you will not be charged for this service.\"    Patient has given verbal consent for Telephone visit?  Yes    What phone number would you like to be contacted at? [phone]    How would you like to obtain your AVS? Venkat Encarnacion, EMT      Phone call duration: 28 minutes  Start 12:53  End 13:21    Reason for visit:  Headache follow-up. This visit was conducted via telephone visit due to the current COVID-19 crisis to reduce patient risk.  Verbal consent was obtained.     Interval history:  This is a 55-year old  who presents to [facility] headache clinic for headache follow-up.  Established patient since 3/2/ 2020, see initial visit note for details.  Headache History:    Onset History:  For 14 years ago and seen 7 neurologists for headaches in [place] and [facility] in [place]. Unfortunately no outside records available,  Patient was started on candesartan for migraine headache but unfortunately " "after a short trial patient's pharmacy requested a change from candesartan to losartan.  Patient reports that headaches more frequent a week since changing from candesartan to losartan. Patient reports that candesartan was more effective with controlling headaches.   Patient reports that he has been doing Emgality since 5/12/2020 and no side effects reported so far.   Patient reports that he had Botox once by one of [facility] doctors in 2007 and he is opened to get Botox injections.   Headache is predominately -temples, forehead, top of the head. Pain feels like sharp/dull and a solid pain/pressure feeling.   Headache frequency daily and baseline headache 6/10 (baseline) and can go up to 8/10 (lately after changes from candesartan to losartan).   No problems with TMJ and denies grinding teeth when sleeps.   Reports that blood pressure very good. Reports BP ranges 105-120 for systolic BP.   Treatments Tried:    Started Aimovig 2 years ago and \"levels\" his headache up and satisfied about 25%   Tylenol extra strength for a few month  [facility]  Sumatriptan injectable did not work  Oxycodone -in the past and \"hate to take it\"  Tizanidine -did not help  miloxicam -did not help  Rizatriptan did not help  Botox -had only once   Ambien -did not help  Amitriptyline -caused \"hearing voices\"  depakote -did not do anything  Dilantin -did not help  topiramate -history of renal stones  Verapamil -it did not do anything  Propranolol did not touch it  Physical [facility]  [facility] -\"[facility]\" did not help  3 sessions of acupuncture did not help  Cefaly -did not help  DHE infusion   Demerol did not help  Uses CPAP for 14 years   Stopped ibuprofen about 2 months ago and no difference  BBB-4-5 years ago   ubrelvy helped but was taking it every day       Headache treatment plan:  To reinstate candesartan and stop  Losartan due to headaches got worse when candesartan stopped and started " losartan  Adding Botox injections  Ubrelvy 100 mg at severe migraine headache onset and may repeat it in 2 hours as needed. Max 200 mg in 24 hours. Limit use to no more than 8 days per month. (MAX, 200 mg/24 hrs; safety of treating more than 8 migraines in a 30-day period has not been established)  Ketorolac as needed.   Follow up in 2-3 months or sooner if needed-video appointment.        Prescriptions for Botox, Ubrelvy and candesartan with given to the patient and side effects reviewed patient is in agreement with side effects and agreed to try the recommended treatment.    PMH, allergies and current prescription medications reviewed    10 point ROS of systems including Constitutional, Eyes, Respiratory, Cardiovascular, Gastroenterology, Genitourinary, Integumentary, Muscularskeletal, Psychiatric were reviewed and no concerns reported today unless as mentioned in the interval history    Patient sounds alert and no in apparent acute distress,  mentation appears normal, judgement and insight intact, normal speech.      I spent a total of 28 minutes for telemedicine consult with Lopez Cancino during today s video meeting. Over 50% of this time was spent counseling the patient and/or coordinating care    LUIS CARLOS Lua [facility]

## 2020-07-28 NOTE — TELEPHONE ENCOUNTER
PA Initiation    Medication: candesartan (ATACAND) 4 MG tablet; Take one tab daily for one week, then one tab twice daily  Insurance Company: [employer] - Phone [phone] Fax [phone]  Pharmacy Filling the Rx: [facility] - [address]  Filling Pharmacy Phone: [phone]  Filling Pharmacy Fax:    Start Date: 7/28/2020        Awaiting additional questions via CMM

## 2020-07-29 NOTE — TELEPHONE ENCOUNTER
Prior Authorization Not Needed per Insurance    Medication: candesartan (ATACAND) 4 MG tablet; Take one tab daily for one week, then one tab twice daily  Insurance Company: [employer] - Phone [phone] Fax [phone]  Expected CoPay:      Pharmacy Filling the Rx: [facility] - [address]  Pharmacy Notified: Yes- relayed information received back,  [facility]

## 2020-08-11 ASSESSMENT — HEADACHE IMPACT TEST (HIT 6)
HIT6 TOTAL SCORE: 65
WHEN YOU HAVE A HEADACHE HOW OFTEN DO YOU WISH YOU COULD LIE DOWN: SOMETIMES
WHEN YOU HAVE HEADACHES HOW OFTEN IS THE PAIN SEVERE: VERY OFTEN
HOW OFTEN DO HEADACHES LIMIT YOUR DAILY ACTIVITIES: VERY OFTEN
HOW OFTEN HAVE YOU FELT TOO TIRED TO WORK BECAUSE OF YOUR HEADACHES: VERY OFTEN
HOW OFTEN HAVE YOU FELT FED UP OR IRRITATED BECAUSE OF YOUR HEADACHES: VERY OFTEN
HOW OFTEN DID HEADACHS LIMIT CONCENTRATION ON WORK OR DAILY ACTIVITY: VERY OFTEN

## 2020-08-12 ENCOUNTER — OFFICE VISIT (OUTPATIENT)
Dept: NEUROLOGY | Facility: CLINIC | Age: 55
End: 2020-08-12
Payer: COMMERCIAL

## 2020-08-12 NOTE — LETTER
2020       RE: Lopez Cancino  [address]  [place] [address]     Dear Colleague,    Thank you for referring your patient, Lopez Cancino, to the [facility] at [facility] Please see a copy of my visit note below.    [facility]  [address] [address]  [phone]  Dept: [phone]  ______________________________________________________________________________    Patient: Lopez Cancino   : 1965   MRN: [id number]   2020    INVASIVE PROCEDURE SAFETY CHECKLIST    Date: 2020   Procedure:botox injections per chronic migraine headache protocol  Patient Name: Lopez Cancino  MRN: [id number]  YOB: 1965    Action: Complete sections as appropriate. Any discrepancy results in a HARD COPY until resolved.     PRE PROCEDURE:  Patient ID verified with 2 identifiers (name and  or MRN): Yes  Procedure and site verified with patient/designee (when able): Yes  Accurate consent documentation in medical record: Yes  H&P (or appropriate assessment) documented in medical record: Yes  H&P must be up to 20 days prior to procedure and updates within 24 hours of procedure as applicable: NA  Relevant diagnostic and radiology test results appropriately labeled and displayed as applicable: NA  Procedure site(s) marked with provider initials: NA    TIMEOUT:  Time-Out performed immediately prior to starting procedure, including verbal and active participation of all team members addressing the following:Yes  * Correct patient identify  * Confirmed that the correct side and site are marked  * An accurate procedure consent form  * Agreement on the procedure to be done  * Correct patient position  * Relevant images and results are properly labeled and appropriately displayed  * The need to administer antibiotics or fluids for irrigation purposes during the procedure as applicable   * Safety precautions based on patient  "history or medication use    DURING PROCEDURE: Verification of correct person, site, and procedures any time the responsibility for care of the patient is transferred to another member of the care team.         PROCEDURE NOTE:      BOTULINUM NEUROTOXIN INJECTION PROCEDURE  DATA:8/12/2020  INDICATIONS FOR PROCEDURES:   Chronic migraine headaches with more than 15 headache days per month and more than 4hours duration. Baseline symptoms have been recalcitrant to oral medications and conservative therapy. Patient is here today for his initial  injection with Botox.    Treatments Tried:    Started Aimovig 2 years ago and \"levels\" his headache up and satisfied about 25%   Tylenol extra strength for a few month  [facility]  Sumatriptan injectable did not work  Oxycodone -in the past and \"hate to take it\"  Tizanidine -did not help  miloxicam -did not help  Rizatriptan did not help  Botox -had only once   Ambien -did not help  Amitriptyline -caused \"hearing voices\"  depakote -did not do anything  Dilantin -did not help  topiramate -history of renal stones  Verapamil -it did not do anything  Propranolol did not touch it  Physical [facility]  [facility] -\"[facility]\" did not help  3 sessions of acupuncture did not help  Cefaly -did not help  DHE infusion   Demerol did not help  Uses CPAP for 14 years   Stopped ibuprofen about 2 months ago and no difference  BBB-4-5 years ago     GOAL OF PROCEDURE:  The goal of this procedure is to decrease pain and enhance functional independence.    TOTAL DOSE ADMINISTERED:  Dose Administered:  155 units  Botox (Botulinum Toxin Type A)       2:1 Dilution    Diluent Used:  Preservative Free Normal Saline DK 2074 EXP 01 DEC 2021  Total Volume of Diluent Used:  4 ml  Lot # [id number]   with Expiration Date: 04/2023  NDC 9793-3749-83  Wasted 45 units  Medication guide was given to the patient  CONSENT:  The risks, benefits, and treatment options were " discussed with the patient who agreed to proceed.    Written consent was obtained     EQUIPMENT USED:  Needles-30 gauge, 0.5 inches for injections  Four 1-ml tuberculin syringes for injections  One sodium chloride 10 ml vial preservative free  Alcohol swabs    SKIN PREPARATION:  Skin preparation was performed using an alcohol wipe.      AREA/MUSCLE INJECTED:  155 units of Botox  1 & 2. SHOULDER GIRDLE & NECK MUSCLES: total of 80 units Botox = Total Dose, 2:1 Dilution   Right upper Trapezius (upper cervical) - 5 units of Botox at 3 site/s.   Left upper Trapezius (upper cervical) - 5 units of Botox at 3 site/s.     Right cervical paraspinals - 5 units of Botox at 2 site/s.   Left cervical paraspinals - 5 units of Botox at 2 site/s.     Left occipitalis - 5 units of Botox at 3 site/s.  Right occipitalis -5 units of Botox at 3 site/s    3. HEAD & SCALP MUSCLES: total of 75 units Botox = Total Dose, 2:1 Dilution  Right Frontalis - 5 units of Botox at 2 site/s.  Left Frontalis - 5 units of Botox at 2 site/s.    Right Temporalis - 5 units of Botox at 4 site/s.  Left Temporalis - 5 units of Botox at 4 site/s.    Right  - 5 units of Botox at 1 site/s.              Left  - 5 units of Botox at 1 site/s.    Procerus - 5 units of Botox at 1 site/s.    RESPONSE TO PROCEDURE:  tolerated the procedure well and there were no immediate complications.  Patient was allowed to recover for an appropriate period of time and was discharged home in stable condition.    FOLLOW UP:    Repeat Botox injections in 12 weeks      This procedure was performed under a hospital privileging agreement with Dr. Axel Rosenthal, APRN, [facility]

## 2020-08-12 NOTE — PROGRESS NOTES
[facility]  [address] [place] [address]  [phone]  Dept: [phone]  ______________________________________________________________________________    Patient: Lopez Cancino   : 1965   MRN: [id number]   2020    INVASIVE PROCEDURE SAFETY CHECKLIST    Date: 2020   Procedure:botox injections per chronic migraine headache protocol  Patient Name: Lopez Cancino  MRN: [id number]  YOB: 1965    Action: Complete sections as appropriate. Any discrepancy results in a HARD COPY until resolved.     PRE PROCEDURE:  Patient ID verified with 2 identifiers (name and  or MRN): Yes  Procedure and site verified with patient/designee (when able): Yes  Accurate consent documentation in medical record: Yes  H&P (or appropriate assessment) documented in medical record: Yes  H&P must be up to 20 days prior to procedure and updates within 24 hours of procedure as applicable: NA  Relevant diagnostic and radiology test results appropriately labeled and displayed as applicable: NA  Procedure site(s) marked with provider initials: NA    TIMEOUT:  Time-Out performed immediately prior to starting procedure, including verbal and active participation of all team members addressing the following:Yes  * Correct patient identify  * Confirmed that the correct side and site are marked  * An accurate procedure consent form  * Agreement on the procedure to be done  * Correct patient position  * Relevant images and results are properly labeled and appropriately displayed  * The need to administer antibiotics or fluids for irrigation purposes during the procedure as applicable   * Safety precautions based on patient history or medication use    DURING PROCEDURE: Verification of correct person, site, and procedures any time the responsibility for care of the patient is transferred to another member of the care team.         PROCEDURE NOTE:      BOTULINUM NEUROTOXIN INJECTION  "PROCEDURE  DATA:8/12/2020  INDICATIONS FOR PROCEDURES:   Chronic migraine headaches with more than 15 headache days per month and more than 4hours duration. Baseline symptoms have been recalcitrant to oral medications and conservative therapy. Patient is here today for his initial  injection with Botox.    Treatments Tried:    Started Aimovig 2 years ago and \"levels\" his headache up and satisfied about 25%   Tylenol extra strength for a few month  [facility]  Sumatriptan injectable did not work  Oxycodone -in the past and \"hate to take it\"  Tizanidine -did not help  miloxicam -did not help  Rizatriptan did not help  Botox -had only once   Ambien -did not help  Amitriptyline -caused \"hearing voices\"  depakote -did not do anything  Dilantin -did not help  topiramate -history of renal stones  Verapamil -it did not do anything  Propranolol did not touch it  Physical [facility]  [facility] -\"[facility]\" did not help  3 sessions of acupuncture did not help  Cefaly -did not help  DHE infusion   Demerol did not help  Uses CPAP for 14 years   Stopped ibuprofen about 2 months ago and no difference  BBB-4-5 years ago     GOAL OF PROCEDURE:  The goal of this procedure is to decrease pain and enhance functional independence.    TOTAL DOSE ADMINISTERED:  Dose Administered:  155 units  Botox (Botulinum Toxin Type A)       2:1 Dilution    Diluent Used:  Preservative Free Normal Saline DK 2074 EXP 01 DEC 2021  Total Volume of Diluent Used:  4 ml  Lot # [id number]   with Expiration Date: 04/2023  NDC 2219-5253-46  Wasted 45 units  Medication guide was given to the patient  CONSENT:  The risks, benefits, and treatment options were discussed with the patient who agreed to proceed.    Written consent was obtained     EQUIPMENT USED:  Needles-30 gauge, 0.5 inches for injections  Four 1-ml tuberculin syringes for injections  One sodium chloride 10 ml vial preservative free  Alcohol swabs    SKIN " PREPARATION:  Skin preparation was performed using an alcohol wipe.      AREA/MUSCLE INJECTED:  155 units of Botox  1 & 2. SHOULDER GIRDLE & NECK MUSCLES: total of 80 units Botox = Total Dose, 2:1 Dilution   Right upper Trapezius (upper cervical) - 5 units of Botox at 3 site/s.   Left upper Trapezius (upper cervical) - 5 units of Botox at 3 site/s.     Right cervical paraspinals - 5 units of Botox at 2 site/s.   Left cervical paraspinals - 5 units of Botox at 2 site/s.     Left occipitalis - 5 units of Botox at 3 site/s.  Right occipitalis -5 units of Botox at 3 site/s    3. HEAD & SCALP MUSCLES: total of 75 units Botox = Total Dose, 2:1 Dilution  Right Frontalis - 5 units of Botox at 2 site/s.  Left Frontalis - 5 units of Botox at 2 site/s.    Right Temporalis - 5 units of Botox at 4 site/s.  Left Temporalis - 5 units of Botox at 4 site/s.    Right  - 5 units of Botox at 1 site/s.              Left  - 5 units of Botox at 1 site/s.    Procerus - 5 units of Botox at 1 site/s.    RESPONSE TO PROCEDURE:  tolerated the procedure well and there were no immediate complications.  Patient was allowed to recover for an appropriate period of time and was discharged home in stable condition.    FOLLOW UP:    Repeat Botox injections in 12 weeks      This procedure was performed under a hospital privileging agreement with LUIS CARLOS Johnson, [facility]

## 2020-09-28 ENCOUNTER — TELEPHONE (OUTPATIENT)
Dept: NEUROLOGY | Facility: CLINIC | Age: 55
End: 2020-09-28

## 2020-09-28 NOTE — TELEPHONE ENCOUNTER
[facility] She let me know we can get pt RFS form on-line. Then we can Fax form to: [phone] once completed.     Isabella DAMON

## 2020-11-04 ENCOUNTER — OFFICE VISIT (OUTPATIENT)
Dept: NEUROLOGY | Facility: CLINIC | Age: 55
End: 2020-11-04
Payer: COMMERCIAL

## 2020-11-04 PROCEDURE — 64615 CHEMODENERV MUSC MIGRAINE: CPT | Performed by: NURSE PRACTITIONER

## 2020-11-04 NOTE — LETTER
2020     RE: Lopez Cancino  [address]  [place] [address]     Dear Colleague,    Thank you for referring your patient, Lopez Cancino, to the [facility] NEUROLOGY [facility] at [facility]. Please see a copy of my visit note below.    [facility] NEUROLOGY [place] [address] [facility]  [address] [facility] [address]  [phone]  Dept: [phone]  ______________________________________________________________________________    Patient: Lopez Cancino   : 1965   MRN: [id number]   2020    INVASIVE PROCEDURE SAFETY CHECKLIST    Date: 2020   Procedure:BOTOX INJECTIONS  Patient Name: Lopez Cancino  MRN: [id number]  YOB: 1965    Action: Complete sections as appropriate. Any discrepancy results in a HARD COPY until resolved.     PRE PROCEDURE:  Patient ID verified with 2 identifiers (name and  or MRN): Yes  Procedure and site verified with patient/designee (when able): Yes  Accurate consent documentation in medical record: Yes  H&P (or appropriate assessment) documented in medical record: Yes  H&P must be up to 20 days prior to procedure and updates within 24 hours of procedure as applicable: NA  Relevant diagnostic and radiology test results appropriately labeled and displayed as applicable: NA  Procedure site(s) marked with provider initials: NA    TIMEOUT:  Time-Out performed immediately prior to starting procedure, including verbal and active participation of all team members addressing the following:Yes  * Correct patient identify  * Confirmed that the correct side and site are marked  * An accurate procedure consent form  * Agreement on the procedure to be done  * Correct patient position  * Relevant images and results are properly labeled and appropriately displayed  * The need to administer antibiotics or fluids for irrigation purposes during the procedure as applicable   * Safety precautions  "based on patient history or medication use    DURING PROCEDURE: Verification of correct person, site, and procedures any time the responsibility for care of the patient is transferred to another member of the care team.       PROCEDURE NOTE:     BOTULINUM NEUROTOXIN INJECTION PROCEDURE  DATA:11/4/2020  INDICATIONS FOR PROCEDURES:   Chronic migraine headaches with more than 15 headache days per month and more than 4hours duration. Baseline symptoms have been recalcitrant to oral medications and conservative therapy. Patient is here today for his repeat   injection with Botox.    Initial Botox injections on 8/12/2020. No problems reported after initial round of injections.   Headache pain level today 5/10 preprocedure  Unchanged post      Treatments Tried:    Started Aimovig 2 years ago and \"levels\" his headache up and satisfied about 25%   Tylenol extra strength for a few month  [facility]olac injection at [facility]  Sumatriptan injectable did not work  Oxycodone -in the past and \"hate to take it\"  Candesartan dizziness  Tizanidine -did not help  miloxicam -did not help  Rizatriptan did not help  Botox -had only once   Ambien -did not help  Amitriptyline -caused \"hearing voices\"  depakote -did not do anything  Dilantin -did not help  topiramate -history of renal stones  Verapamil -it did not do anything  Propranolol did not touch it  Physical therapy  [facility] -\"8 course class\" did not help  3 sessions of acupuncture did not help  Cefaly -did not help  DHE infusion   Demerol did not help  Uses CPAP for 14 years   Stopped ibuprofen about 2 months ago and no difference  BBB-4-5 years ago      GOAL OF PROCEDURE:  The goal of this procedure is to decrease pain and enhance functional independence.     TOTAL DOSE ADMINISTERED:  Dose Administered:  155 units  Botox (Botulinum Toxin Type A)       2:1 Dilution    Diluent Used:  Preservative Free Normal Saline DY 3936 EXP 01 MAY 2022  Total Volume of Diluent " Used:  4 ml  Lot # [id number]   with Expiration Date: 06/2023  NDC 7299-1986-69  Wasted 45 units  CONSENT:  The risks, benefits, and treatment options were discussed with the patient who agreed to proceed.     Written consent was obtained      EQUIPMENT USED:  Needles-30 gauge, 0.5 inches for injections  Four 1-ml tuberculin syringes for injections  One sodium chloride 10 ml vial preservative free  Alcohol swabs     SKIN PREPARATION:  Skin preparation was performed using an alcohol wipe.        AREA/MUSCLE INJECTED:  155 units of Botox    Right upper Trapezius (upper cervical) - 5 units of Botox at 3 site/s.   Left upper Trapezius (upper cervical) - 5 units of Botox at 3 site/s.      Right cervical paraspinals - 5 units of Botox at 2 site/s.   Left cervical paraspinals - 5 units of Botox at 2 site/s.      Left occipitalis - 5 units of Botox at 3 site/s.  Right occipitalis -5 units of Botox at 3 site/s       Right Frontalis - 5 units of Botox at 2 site/s.  Left Frontalis - 5 units of Botox at 2 site/s.     Right Temporalis - 5 units of Botox at 4 site/s.  Left Temporalis - 5 units of Botox at 4 site/s.     Right  - 5 units of Botox at 1 site/s.              Left  - 5 units of Botox at 1 site/s.     Procerus - 5 units of Botox at 1 site/s.     RESPONSE TO PROCEDURE:  tolerated the procedure well and there were no immediate complications.  Patient was allowed to recover for an appropriate period of time and was discharged home in stable condition.     FOLLOW UP:     Repeat Botox injections in 12 weeks           LUIS CARLOS Pritchett, CNP  [facility]      This procedure was performed under a hospital privileging agreement with Dr. Reyna

## 2020-11-09 ENCOUNTER — VIRTUAL VISIT (OUTPATIENT)
Dept: FAMILY MEDICINE | Facility: OTHER | Age: 55
End: 2020-11-09

## 2020-11-09 NOTE — PROGRESS NOTES
"Date: 2020 15:02:05  Clinician: Nilam Degroot  Clinician NPI: [id number]  Patient: Lopez Cancino  Patient : 1965  Patient Address: [address] [place] [address] [address], [place] [address]  Patient Phone: [phone]  Visit Protocol: URI  Patient Summary:  Lopez is a 55 year old ( : 1965 ) male who initiated a [facility] Visit for COVID-19 (Coronavirus) evaluation and screening. When asked the question \"Please sign me up to receive news, health information and promotions. \", Lopez responded \"No\".    When asked when his symptoms started, Lopez reported that he does not have any symptoms.   He denies taking antibiotic medication in the past month and having recent facial or sinus surgery in the past 60 days.    Pertinent COVID-19 (Coronavirus) information  Lopez does not work or volunteer as healthcare worker or a . In the past 14 days, Lopez has not worked or volunteered at a healthcare facility or group living setting.   In the past 14 days, he also has not lived in a congregate living setting.   Lopez has had a close contact with a laboratory-confirmed COVID-19 patient in the last 14 days. He was not exposed at his work. Date Lopez was exposed to the laboratory-confirmed COVID-19 patient: 10/31/2020   Additional information about contact with COVID-19 (Coronavirus) patient as reported by the patient (free text): in home   Lopez is not living in the same household with the COVID-19 positive patient. He was in an enclosed space for greater than 15 minutes with the COVID-19 patient.   During the encounter, neither were wearing masks.   Since 2019, Lopez has not been tested for COVID-19 and has not had upper respiratory infection or influenza-like illness.   Pertinent medical history  Lopez needs a return to work/school note.   Weight: 188 lbs   Lopez does not smoke or use smokeless tobacco.   Weight: 188 lbs    MEDICATIONS: Tylenol PM Extra Strength oral, Omega-3 oral, aspirin oral, famotidine " oral, ALLERGIES: Penicillins, Statins-Hmg-Coa Reductase Inhibitors  Clinician Response:  Dear Lopez,   Based on your exposure to COVID-19 (coronavirus), we would like to test you for this virus.  1. Please call [phone] to schedule your visit. Explain that you were referred by [facility] to have a COVID-19 test. Be ready to [facility].  * If you need to schedule in [facility] please call [phone] or for [employer] employees please call [phone].   * If you need to schedule in the [place] area please call [phone]. [place] employees call [phone].   The following will serve as your written order for this COVID Test, ordered by me, for the indication of suspected COVID [Z20.828]: The test will be ordered in [facility], our electronic health record, after you are scheduled. It will show as ordered and authorized by Isaias Villareal MD.  Order: COVID-19 (coronavirus) PCR for ASYMPTOMATIC EXPOSURE testing from [facility].   If you know you have had close contact with someone who tested positive, you should be quarantined for 14 days after this exposure. You should stay in quarantine for the14 days even if the covid test is negative, the optimal time to test after exposure is 5-7 days from the exposure  Quarantine means   What should I do?  For safety, it's very important to follow these rules. Do this for 14 days after the date you were last exposed to the virus..  Stay home and away from others. Don't go to school or anywhere else. Generally quarantine means staying home from work but there are some exceptions to this. Please contact your workplace.   No hugging, kissing or shaking hands.  Don't let anyone visit.  Cover your mouth and nose with a mask, tissue or washcloth to avoid spreading germs.  Wash your hands and face often. Use soap and water.  What are the symptoms of COVID-19?  The most common symptoms are cough, fever and trouble breathing. Less common symptoms include headache, body  aches, fatigue (feeling very tired), chills, sore throat, stuffy or runny nose, diarrhea (loose poop), loss of taste or smell, belly pain, and nausea or vomiting (feeling sick to your stomach or throwing up).  After 14 days, if you have still don't have symptoms, you likely don't have this virus.  If you develop symptoms, follow these guidelines.  If you're normally healthy: Please start another [facility] visit to report your symptoms. Go to [email or web address].  If you have a serious health problem (like cancer, heart failure, an organ transplant or kidney disease): Call your specialty clinic. Let them know that you might have COVID-19.  2. When it's time for your COVID test:  Stay at least 6 feet away from others. (If someone will drive you to your test, stay in the backseat, as far away from the  as you can.)  Cover your mouth and nose with a mask, tissue or washcloth.  Go straight to the testing site. Don't make any stops on the way there or back.  Please note  Caregivers in these groups are at risk for severe illness due to COVID-19:  o People 65 years and older  o People who live in a nursing home or long-term care facility  o People with chronic disease (lung, heart, cancer, diabetes, kidney, liver, immunologic)  o People who have a weakened immune system, including those who:  Are in cancer treatment  Take medicine that weakens the immune system, such as corticosteroids  Had a bone marrow or organ transplant  Have an immune deficiency  Have poorly controlled HIV or AIDS  Are obese (body mass index of 40 or higher)  Smoke regularly  Where can I get more information?   [facility] -- About COVID-19: [email or web address]  CDC -- What to Do If You're Sick: www.cdc.gov/coronavirus/2019-ncov/about/steps-when-sick.html  CDC -- Ending Home Isolation: www.cdc.gov/coronavirus/2019-ncov/hcp/disposition-in-home-patients.html  CDC -- Caring for Someone:  www.cdc.gov/coronavirus/2019-ncov/if-you-are-sick/care-for-someone.html  [facility] -- Interim Guidance for Hospital Discharge to Home: www.health.[facility].[place]/diseases/coronavirus/hcp/hospdischarge.pdf  [facility] clinical trials (COVID-19 research studies): clinicalaffairs.[facility]-clinical-trials  Below are the COVID-19 hotlines at the [place] Department of Health ([facility]). Interpreters are available.  For health questions: Call 466-666-8171 or 1-800.793.1070 (7 a.m. to 7 p.m.)  For questions about schools and childcare: Call 583-573-3481 or 1-984.352.1780 (7 a.m. to 7 p.m.)    Diagnosis: Contact with and (suspected) exposure to other viral communicable diseases  Diagnosis ICD: Z20.828

## 2020-11-11 DIAGNOSIS — Z20.822 ENCOUNTER FOR LABORATORY TESTING FOR COVID-19 VIRUS: Primary | ICD-10-CM

## 2020-11-11 PROCEDURE — U0003 INFECTIOUS AGENT DETECTION BY NUCLEIC ACID (DNA OR RNA); SEVERE ACUTE RESPIRATORY SYNDROME CORONAVIRUS 2 (SARS-COV-2) (CORONAVIRUS DISEASE [COVID-19]), AMPLIFIED PROBE TECHNIQUE, MAKING USE OF HIGH THROUGHPUT TECHNOLOGIES AS DESCRIBED BY CMS-2020-01-R: HCPCS | Performed by: FAMILY MEDICINE

## 2020-11-12 ENCOUNTER — AMBULATORY - HEALTHEAST (OUTPATIENT)
Dept: FAMILY MEDICINE | Facility: CLINIC | Age: 55
End: 2020-11-12

## 2020-11-12 DIAGNOSIS — Z20.822 SUSPECTED 2019 NOVEL CORONAVIRUS INFECTION: ICD-10-CM

## 2020-11-12 LAB
SARS-COV-2 RNA SPEC QL NAA+PROBE: NOT DETECTED
SPECIMEN SOURCE: NORMAL

## 2021-01-03 ENCOUNTER — HEALTH MAINTENANCE LETTER (OUTPATIENT)
Age: 56
End: 2021-01-03

## 2021-10-10 ENCOUNTER — HEALTH MAINTENANCE LETTER (OUTPATIENT)
Age: 56
End: 2021-10-10

## 2022-01-29 ENCOUNTER — HEALTH MAINTENANCE LETTER (OUTPATIENT)
Age: 57
End: 2022-01-29

## 2022-09-17 ENCOUNTER — HEALTH MAINTENANCE LETTER (OUTPATIENT)
Age: 57
End: 2022-09-17

## 2023-05-06 ENCOUNTER — HEALTH MAINTENANCE LETTER (OUTPATIENT)
Age: 58
End: 2023-05-06